# Patient Record
Sex: FEMALE | HISPANIC OR LATINO | URBAN - METROPOLITAN AREA
[De-identification: names, ages, dates, MRNs, and addresses within clinical notes are randomized per-mention and may not be internally consistent; named-entity substitution may affect disease eponyms.]

---

## 2020-10-08 ENCOUNTER — OFFICE VISIT (OUTPATIENT)
Dept: URBAN - METROPOLITAN AREA CLINIC 74 | Facility: CLINIC | Age: 55
End: 2020-10-08
Payer: COMMERCIAL

## 2020-10-08 ENCOUNTER — DASHBOARD ENCOUNTERS (OUTPATIENT)
Age: 55
End: 2020-10-08

## 2020-10-08 DIAGNOSIS — R11.0 NAUSEA: ICD-10-CM

## 2020-10-08 DIAGNOSIS — Z12.11 SCREENING FOR COLON CANCER: ICD-10-CM

## 2020-10-08 DIAGNOSIS — K29.30 CHRONIC SUPERFICIAL GASTRITIS WITHOUT BLEEDING: ICD-10-CM

## 2020-10-08 DIAGNOSIS — R10.13 EPIGASTRIC DISCOMFORT: ICD-10-CM

## 2020-10-08 PROBLEM — 422587007: Status: ACTIVE | Noted: 2020-10-08

## 2020-10-08 PROBLEM — 305058001: Status: ACTIVE | Noted: 2020-10-08

## 2020-10-08 PROBLEM — 196735001: Status: ACTIVE | Noted: 2020-10-08

## 2020-10-08 PROBLEM — 119416008: Status: ACTIVE | Noted: 2020-10-08

## 2020-10-08 PROCEDURE — G8482 FLU IMMUNIZE ORDER/ADMIN: HCPCS | Performed by: INTERNAL MEDICINE

## 2020-10-08 PROCEDURE — G8420 CALC BMI NORM PARAMETERS: HCPCS | Performed by: INTERNAL MEDICINE

## 2020-10-08 PROCEDURE — 1036F TOBACCO NON-USER: CPT | Performed by: INTERNAL MEDICINE

## 2020-10-08 PROCEDURE — 3017F COLORECTAL CA SCREEN DOC REV: CPT | Performed by: INTERNAL MEDICINE

## 2020-10-08 PROCEDURE — G8427 DOCREV CUR MEDS BY ELIG CLIN: HCPCS | Performed by: INTERNAL MEDICINE

## 2020-10-08 PROCEDURE — 99204 OFFICE O/P NEW MOD 45 MIN: CPT | Performed by: INTERNAL MEDICINE

## 2020-10-08 RX ORDER — SUCRALFATE 1 G/1
1 TABLET ON AN EMPTY STOMACH TABLET ORAL
Qty: 120 TABLET | Refills: 1 | OUTPATIENT
Start: 2020-10-08 | End: 2020-12-06

## 2020-10-08 NOTE — HPI-TODAY'S VISIT:
The patient is a 55-year-old female from [place] who is self-referred for consultation. The patient states that ever since she was a child she has experienced recurrent abdominal discomfort, either a burning or cramping sensation over the epigastrium and supraumbilical area.  The patient has occasional nausea with no vomiting, denied having any dysphagia, odynophagia, has nausea with no vomiting.  The patient has been treated in the [place] by gastroenterologist.  The patient had an EGD in February 2020 which revealed H. pylori negative gastritis.  A previous endoscopy several years before showed similar changes. The patient denies using aspirin, anti-inflammatory medications, she denies tobacco use alcohol use, currently is not taking any medications, in the past she was treated with famotidine 20 mg daily. The patient claims having normal bowel movements, defecates every other day, denied any rectal bleeding, hematochezia, denied any unexplained weight loss. The patient agreed to start therapy with sucralfate 1 g 1 hour AC.  She will return for a follow-up visit in 30 days.  At that point in time the patient will be scheduled to have a screening colonoscopy, the patient has never had a screening colonoscopy before.

## 2020-11-10 ENCOUNTER — OFFICE VISIT (OUTPATIENT)
Dept: URBAN - METROPOLITAN AREA CLINIC 74 | Facility: CLINIC | Age: 55
End: 2020-11-10